# Patient Record
Sex: FEMALE | HISPANIC OR LATINO | Employment: UNEMPLOYED | URBAN - METROPOLITAN AREA
[De-identification: names, ages, dates, MRNs, and addresses within clinical notes are randomized per-mention and may not be internally consistent; named-entity substitution may affect disease eponyms.]

---

## 2019-10-02 ENCOUNTER — OFFICE VISIT (OUTPATIENT)
Dept: URGENT CARE | Facility: CLINIC | Age: 54
End: 2019-10-02

## 2019-10-02 VITALS
WEIGHT: 146.8 LBS | TEMPERATURE: 99.8 F | DIASTOLIC BLOOD PRESSURE: 80 MMHG | HEART RATE: 102 BPM | RESPIRATION RATE: 16 BRPM | OXYGEN SATURATION: 97 % | SYSTOLIC BLOOD PRESSURE: 130 MMHG | BODY MASS INDEX: 27.02 KG/M2 | HEIGHT: 62 IN

## 2019-10-02 DIAGNOSIS — J22 ACUTE RESPIRATORY INFECTION: ICD-10-CM

## 2019-10-02 DIAGNOSIS — R68.89 FLU-LIKE SYMPTOMS: ICD-10-CM

## 2019-10-02 DIAGNOSIS — J02.9 ACUTE PHARYNGITIS, UNSPECIFIED ETIOLOGY: ICD-10-CM

## 2019-10-02 DIAGNOSIS — Z20.89 EXPOSURE TO PNEUMONIA: ICD-10-CM

## 2019-10-02 LAB
FLUAV+FLUBV AG SPEC QL IA: NEGATIVE
INT CON NEG: NEGATIVE
INT CON NEG: NEGATIVE
INT CON POS: POSITIVE
INT CON POS: POSITIVE
S PYO AG THROAT QL: NEGATIVE

## 2019-10-02 PROCEDURE — 87804 INFLUENZA ASSAY W/OPTIC: CPT | Performed by: FAMILY MEDICINE

## 2019-10-02 PROCEDURE — 99204 OFFICE O/P NEW MOD 45 MIN: CPT | Performed by: FAMILY MEDICINE

## 2019-10-02 PROCEDURE — 87880 STREP A ASSAY W/OPTIC: CPT | Performed by: FAMILY MEDICINE

## 2019-10-02 RX ORDER — AZITHROMYCIN 250 MG/1
TABLET, FILM COATED ORAL
Qty: 6 TAB | Refills: 0 | Status: SHIPPED | OUTPATIENT
Start: 2019-10-02

## 2019-10-02 RX ORDER — OSELTAMIVIR PHOSPHATE 75 MG/1
CAPSULE ORAL
Qty: 10 CAP | Refills: 0 | Status: CANCELLED | OUTPATIENT
Start: 2019-10-02

## 2019-10-02 NOTE — LETTER
October 2, 2019         Patient: Opal Pérez   YOB: 1965   Date of Visit: 10/2/2019           To Whom it May Concern:    Opal Pérez was seen in my clinic on 10/2/2019.     Please excuse from work for 10/2 thru and including 10/6/19 due to medical condition.    If you have any questions or concerns, please don't hesitate to call.        Sincerely,           Lopez Cohn M.D.  Electronically Signed

## 2019-10-02 NOTE — PROGRESS NOTES
Chief Complaint:    Chief Complaint   Patient presents with   • Other     fever, back pain, throat pain, chills, chest pain x4 days        History of Present Illness:    Visit conducted with [employer] translation service. This is a new problem. 4 days ago, she started with chills and a lot of phlegm in the throat. The chills improved some, but came back today. She went to another clinic today and her temp was taken there - it was 104 F per patient. She reports that clinic told her to go to ER due to high fever. She was not treated there. She did not feel her condition was an Emergency, so she came here. She still has sore throat and coughing. The cough is mostly non-productive, but the other day she coughed up brown phlegm. She also has headache and pain in chest and back, but no pain in extremities. No nasal symptoms, vomiting, or diarrhea. Overall at least moderate severity and not getting better. She does not have history of Asthma or other chronic lung condition. She has been visiting the hospital a lot over past 2 weeks because her father is in hospital with pneumonia.      Review of Systems:    Constitutional: See HPI.  Eyes: Negative for change in vision, photophobia, pain, redness, and discharge.  ENT: See HPI.   Respiratory: See HPI.   Cardiovascular: See HPI.   Gastrointestinal: Negative for abdominal pain, nausea, vomiting, diarrhea, constipation, blood in stool, and melena.   Genitourinary: Negative for dysuria, urinary urgency, urinary frequency, hematuria, and flank pain.   Musculoskeletal: See HPI.   Skin: Negative for rash and itching.   Neurological: See HPI.   Endo: Negative for polydipsia.   Heme: Does not bruise/bleed easily.   Psychiatric/Behavioral: Negative for depression, suicidal ideas, hallucinations, memory loss and substance abuse. The patient is not nervous/anxious and does not have insomnia.        Past Medical History:    History reviewed. No pertinent past medical history.    Past Surgical  "History:    Past Surgical History:   Procedure Laterality Date   • US-NEEDLE CORE BX-BREAST PANEL       Social History:    Social History     Socioeconomic History   • Marital status:      Spouse name: Not on file   • Number of children: Not on file   • Years of education: Not on file   • Highest education level: Not on file   Occupational History   • Not on file   Social Needs   • Financial resource strain: Not on file   • Food insecurity:     Worry: Not on file     Inability: Not on file   • Transportation needs:     Medical: Not on file     Non-medical: Not on file   Tobacco Use   • Smoking status: Never Smoker   • Smokeless tobacco: Never Used   Substance and Sexual Activity   • Alcohol use: No   • Drug use: No   • Sexual activity: Not on file   Lifestyle   • Physical activity:     Days per week: Not on file     Minutes per session: Not on file   • Stress: Not on file   Relationships   • Social connections:     Talks on phone: Not on file     Gets together: Not on file     Attends Jewish service: Not on file     Active member of club or organization: Not on file     Attends meetings of clubs or organizations: Not on file     Relationship status: Not on file   • Intimate partner violence:     Fear of current or ex partner: Not on file     Emotionally abused: Not on file     Physically abused: Not on file     Forced sexual activity: Not on file   Other Topics Concern   • Not on file   Social History Narrative   • Not on file     Family History:    History reviewed. No pertinent family history.    Medications:    No current outpatient medications on file prior to visit.     No current facility-administered medications on file prior to visit.      Allergies:    No Known Allergies      Vitals:    Vitals:    10/02/19 1617   BP: 130/80   Pulse: (!) 102   Resp: 16   Temp: 37.7 °C (99.8 °F)   TempSrc: Temporal   SpO2: 97%   Weight: 66.6 kg (146 lb 12.8 oz)   Height: 1.575 m (5' 2\")       Physical " Exam:    Constitutional: Vital signs reviewed. Appears well-developed and well-nourished. Fatigued. Occl cough. No acute distress.   Eyes: Sclera white, conjunctivae clear. PERRLA.  ENT: External ears normal. External auditory canals normal without discharge. TMs translucent and non-bulging. Hearing normal. Nasal mucosa pink. Lips/teeth are normal. Oral mucosa pink and moist. Posterior pharynx: WNL.  Neck: Neck supple.   Cardiovascular: Tachycardic. Regular rhythm. No murmur.  Pulmonary/Chest: Respirations non-labored. Clear to auscultation bilaterally.  Lymph: Cervical nodes without tenderness or enlargement.  Musculoskeletal: Normal gait. Normal range of motion. No muscular atrophy or weakness.  Neurological: Alert and oriented to person, place, and time. CN 2-12 intact. Muscle tone normal. Coordination normal.   Skin: No rashes or lesions. Warm, dry, normal turgor.  Psychiatric: Normal mood and affect. Behavior is normal. Judgment and thought content normal.       Diagnostics:    POCT Influenza A/B   Order: [id number]   Status:  Final result   Visible to patient:  No (Not Released) Next appt:  None Dx:  Flu-like symptoms   Component 4:30 PM   Rapid Influenza A-B negative    Internal Control Positive Positive    Internal Control Negative Negative          Specimen Collected: 10/02/19  4:30 PM Last Resulted: 10/02/19  4:57 PM           POCT Rapid Strep A   Order: [id number]   Status:  Final result   Visible to patient:  No (Not Released) Next appt:  None Dx:  Acute pharyngitis, unspecified etiology   Component 4:30 PM   Rapid Strep Screen negative    Internal Control Positive Positive    Internal Control Negative Negative          Specimen Collected: 10/02/19  4:30 PM Last Resulted: 10/02/19  4:57 PM                Assessment / Plan:    1. Flu-like symptoms  - POCT Influenza A/B    2. Acute pharyngitis, unspecified etiology  - POCT Rapid Strep A    3. Acute respiratory infection  - azithromycin (ZITHROMAX) 250 MG  Tab; 2 TABS BY MOUTH ON DAY 1, 1 TAB ON DAYS 2-5.  Dispense: 6 Tab; Refill: 0    4. Exposure to pneumonia  - azithromycin (ZITHROMAX) 250 MG Tab; 2 TABS BY MOUTH ON DAY 1, 1 TAB ON DAYS 2-5.  Dispense: 6 Tab; Refill: 0      Work note given - excuse for 10/2 thru and including 10/6/19.    Discussed with her DDX, management options, and risks, benefits, and alternatives to treatment plan agreed upon.    May use OTC Tylenol/Ibuprofen prn fever, headache, sore throat, and/or body aches.    May use OTC cough med prn.    She prefers antibiotic treatment due to severity of symptoms, understands may be viral condition for which antibiotic won't work, but viral condition will eventually self-resolve.    Agreeable to medication prescribed.    Discussed expected course of duration, time for improvement, and to seek follow-up in Emergency Room, urgent care, or with PCP if getting worse at any time or not improving within expected time frame.